# Patient Record
Sex: FEMALE | Race: WHITE | NOT HISPANIC OR LATINO | URBAN - METROPOLITAN AREA
[De-identification: names, ages, dates, MRNs, and addresses within clinical notes are randomized per-mention and may not be internally consistent; named-entity substitution may affect disease eponyms.]

---

## 2021-05-12 ENCOUNTER — LAB OUTSIDE AN ENCOUNTER (OUTPATIENT)
Dept: URBAN - METROPOLITAN AREA CLINIC 118 | Facility: CLINIC | Age: 26
End: 2021-05-12

## 2021-05-12 ENCOUNTER — OFFICE VISIT (OUTPATIENT)
Dept: URBAN - METROPOLITAN AREA CLINIC 118 | Facility: CLINIC | Age: 26
End: 2021-05-12
Payer: COMMERCIAL

## 2021-05-12 VITALS
SYSTOLIC BLOOD PRESSURE: 115 MMHG | TEMPERATURE: 98.4 F | DIASTOLIC BLOOD PRESSURE: 77 MMHG | HEART RATE: 76 BPM | BODY MASS INDEX: 25.17 KG/M2 | WEIGHT: 128.2 LBS | HEIGHT: 60 IN

## 2021-05-12 DIAGNOSIS — K62.89 RECTAL PAIN: ICD-10-CM

## 2021-05-12 DIAGNOSIS — K62.5 RECTAL BLEEDING: ICD-10-CM

## 2021-05-12 DIAGNOSIS — R19.4 CHANGE IN BOWEL HABITS: ICD-10-CM

## 2021-05-12 PROCEDURE — 99244 OFF/OP CNSLTJ NEW/EST MOD 40: CPT | Performed by: INTERNAL MEDICINE

## 2021-05-12 RX ORDER — ALBUTEROL SULFATE 90 UG/1
1 PUFF AS NEEDED AEROSOL, METERED RESPIRATORY (INHALATION)
Status: ACTIVE | COMMUNITY

## 2021-05-12 NOTE — HPI-TODAY'S VISIT:
27 yo F was referred by ORTIZ Nielsen for blood in the stool. A copy of this document will be sent to the provider. She reports onset of sx 4-6 months ago. She notes BRB on toilet paper on coating stool ~1x/month. She also complains of rectal pain, itching/burning. BMs QD with soft/formed or loose stool, occ straining. This has been ongoing since COVID-19 infection in August 2020. She also complains of bloating, nausea with BMs. She reports 1 episode of lower abd pain in early March that was mildly relieved with BMs. She denies wt loss, fever, skin changes, joint pains, mouth ulcers. No FH of IBD/colon cancer.

## 2021-06-07 ENCOUNTER — OFFICE VISIT (OUTPATIENT)
Dept: URBAN - METROPOLITAN AREA SURGERY CENTER 23 | Facility: SURGERY CENTER | Age: 26
End: 2021-06-07
Payer: COMMERCIAL

## 2021-06-07 DIAGNOSIS — K62.5 ANAL BLEEDING: ICD-10-CM

## 2021-06-07 DIAGNOSIS — R19.7 ACUTE DIARRHEA: ICD-10-CM

## 2021-06-07 PROCEDURE — 45380 COLONOSCOPY AND BIOPSY: CPT | Performed by: INTERNAL MEDICINE

## 2021-06-07 PROCEDURE — G8907 PT DOC NO EVENTS ON DISCHARG: HCPCS | Performed by: INTERNAL MEDICINE

## 2021-06-07 RX ORDER — ALBUTEROL SULFATE 90 UG/1
1 PUFF AS NEEDED AEROSOL, METERED RESPIRATORY (INHALATION)
Status: ACTIVE | COMMUNITY

## 2021-06-21 ENCOUNTER — DASHBOARD ENCOUNTERS (OUTPATIENT)
Age: 26
End: 2021-06-21

## 2021-06-21 ENCOUNTER — OFFICE VISIT (OUTPATIENT)
Dept: URBAN - METROPOLITAN AREA CLINIC 118 | Facility: CLINIC | Age: 26
End: 2021-06-21
Payer: COMMERCIAL

## 2021-06-21 VITALS
WEIGHT: 124.2 LBS | HEIGHT: 60 IN | BODY MASS INDEX: 24.39 KG/M2 | SYSTOLIC BLOOD PRESSURE: 124 MMHG | HEART RATE: 80 BPM | DIASTOLIC BLOOD PRESSURE: 76 MMHG | TEMPERATURE: 97.3 F

## 2021-06-21 DIAGNOSIS — K62.89 RECTAL PAIN: ICD-10-CM

## 2021-06-21 DIAGNOSIS — R19.4 CHANGE IN BOWEL HABITS: ICD-10-CM

## 2021-06-21 DIAGNOSIS — K62.5 RECTAL BLEEDING: ICD-10-CM

## 2021-06-21 PROBLEM — 129851009: Status: ACTIVE | Noted: 2021-05-12

## 2021-06-21 PROBLEM — 77880009: Status: ACTIVE | Noted: 2021-05-12

## 2021-06-21 PROBLEM — 12063002: Status: ACTIVE | Noted: 2021-05-12

## 2021-06-21 PROCEDURE — 99213 OFFICE O/P EST LOW 20 MIN: CPT | Performed by: INTERNAL MEDICINE

## 2021-06-21 RX ORDER — ALBUTEROL SULFATE 90 UG/1
1 PUFF AS NEEDED AEROSOL, METERED RESPIRATORY (INHALATION)
Status: ACTIVE | COMMUNITY

## 2021-06-21 NOTE — HPI-TODAY'S VISIT:
25 yo F presents for f/u. Colonoscopy on 6/7/21 with no sign of MC, IBD. Internal hemorrhoids present. Today, she reports BMs qd w/soft and formed stool, no straining. She reports sx are much better. She has not tried taking daily fiber. Denies rectal pain, GI blood loss, abd pain, diarrhea, constipation, wt loss.